# Patient Record
Sex: FEMALE | Race: WHITE | NOT HISPANIC OR LATINO | URBAN - METROPOLITAN AREA
[De-identification: names, ages, dates, MRNs, and addresses within clinical notes are randomized per-mention and may not be internally consistent; named-entity substitution may affect disease eponyms.]

---

## 2023-10-05 ENCOUNTER — HOME VISIT (OUTPATIENT)
Dept: PRIMARY CARE | Facility: CLINIC | Age: 83
End: 2023-10-05

## 2023-10-05 DIAGNOSIS — I48.11 LONGSTANDING PERSISTENT ATRIAL FIBRILLATION (MULTI): ICD-10-CM

## 2023-10-05 DIAGNOSIS — F03.90 SENILE DEMENTIA, UNCOMPLICATED (MULTI): ICD-10-CM

## 2023-10-05 DIAGNOSIS — I10 PRIMARY HYPERTENSION: Primary | ICD-10-CM

## 2023-10-05 DIAGNOSIS — F41.9 ANXIETY: ICD-10-CM

## 2023-10-05 PROCEDURE — 99349 HOME/RES VST EST MOD MDM 40: CPT | Performed by: INTERNAL MEDICINE

## 2023-10-09 PROBLEM — I10 PRIMARY HYPERTENSION: Status: ACTIVE | Noted: 2023-10-09

## 2023-10-09 PROBLEM — F03.90 SENILE DEMENTIA, UNCOMPLICATED (MULTI): Status: ACTIVE | Noted: 2023-10-09

## 2023-10-09 PROBLEM — F41.9 ANXIETY: Status: ACTIVE | Noted: 2023-10-09

## 2023-10-09 PROBLEM — I48.11 LONGSTANDING PERSISTENT ATRIAL FIBRILLATION (MULTI): Status: ACTIVE | Noted: 2023-10-09

## 2023-10-09 NOTE — PROGRESS NOTES
Subjective   Patient ID: Kathleen Willams is a 83 y.o. female who presents for No chief complaint on file..  [facility]: [facility]  [facility]      Kathleen Willams is a 83 y.o. female that is presenting today for No chief complaint on file..  This encounter was created solely for billing purposes. The full encounter details, including the assessment and plan for the patient, can be found at the [facility] facility.    Pepe Terry MD     Review of Systems    Objective   Physical Exam    Assessment/Plan   Diagnoses and all orders for this visit:  Primary hypertension  Longstanding persistent atrial fibrillation (CMS/HCC)  Senile dementia, uncomplicated (CMS/HCC)  Anxiety

## 2023-10-09 NOTE — PROGRESS NOTES
Subjective   Patient ID: Kathleen Willams is a 83 y.o. female who presents for No chief complaint on file..  [facility]: [facility]  [facility]      Kathleen Willams is a 83 y.o. female that is presenting today for follow up care.This encounter was created solely for billing purposes. The full encounter details, including the assessment and plan for the patient, can be found at the [place] facility.    Pepe Terry MD     Review of Systems    Objective   Physical Exam    Assessment/Plan   There are no diagnoses linked to this encounter.

## 2024-01-04 ENCOUNTER — HOME VISIT (OUTPATIENT)
Dept: PRIMARY CARE | Facility: CLINIC | Age: 84
End: 2024-01-04
Payer: MEDICARE

## 2024-01-04 DIAGNOSIS — F41.9 ANXIETY: ICD-10-CM

## 2024-01-04 DIAGNOSIS — F03.90 SENILE DEMENTIA, UNCOMPLICATED (MULTI): ICD-10-CM

## 2024-01-04 DIAGNOSIS — I48.11 LONGSTANDING PERSISTENT ATRIAL FIBRILLATION (MULTI): ICD-10-CM

## 2024-01-04 DIAGNOSIS — I10 PRIMARY HYPERTENSION: Primary | ICD-10-CM

## 2024-01-04 PROCEDURE — 99348 HOME/RES VST EST LOW MDM 30: CPT | Performed by: INTERNAL MEDICINE

## 2024-01-04 NOTE — PROGRESS NOTES
[facility]: [facility]  [facility]      Kathleen Willams is a 83 y.o. female that is presenting today for No chief complaint on file..  This encounter was created solely for billing purposes. The full encounter details, including the assessment and plan for the patient, can be found at the [facility] facility.    Pepe Terry MD

## 2024-04-04 ENCOUNTER — HOME VISIT (OUTPATIENT)
Dept: PRIMARY CARE | Facility: CLINIC | Age: 84
End: 2024-04-04
Payer: MEDICARE

## 2024-04-04 DIAGNOSIS — I10 PRIMARY HYPERTENSION: ICD-10-CM

## 2024-04-04 DIAGNOSIS — F03.90 SENILE DEMENTIA, UNCOMPLICATED (MULTI): ICD-10-CM

## 2024-04-04 DIAGNOSIS — I48.11 LONGSTANDING PERSISTENT ATRIAL FIBRILLATION (MULTI): Primary | ICD-10-CM

## 2024-04-04 PROCEDURE — 99348 HOME/RES VST EST LOW MDM 30: CPT | Performed by: INTERNAL MEDICINE

## 2024-04-05 NOTE — PROGRESS NOTES
[facility]: [facility]  [facility]NCOUNTER      Kathleen Willams is a 83 y.o. female that is presenting today for No chief complaint on file..  This encounter was created solely for billing purposes. The full encounter details, including the assessment and plan for the patient, can be found at the [facility].    Pepe Terry MD

## 2024-07-11 ENCOUNTER — HOME VISIT (OUTPATIENT)
Dept: PRIMARY CARE | Facility: CLINIC | Age: 84
End: 2024-07-11
Payer: MEDICARE

## 2024-07-11 DIAGNOSIS — I48.11 LONGSTANDING PERSISTENT ATRIAL FIBRILLATION (MULTI): Primary | ICD-10-CM

## 2024-07-11 DIAGNOSIS — F03.90 SENILE DEMENTIA, UNCOMPLICATED (MULTI): ICD-10-CM

## 2024-07-11 DIAGNOSIS — I10 PRIMARY HYPERTENSION: ICD-10-CM

## 2024-07-15 NOTE — PROGRESS NOTES
[facility]: [facility]  [facility]NCOUN[facility]      Kathleen Willams is a 83 y.o. female that is presenting today for No chief complaint on file..  This encounter was created solely for billing purposes. The full encounter details, including the assessment and plan for the patient, can be found at the [facility].    Pepe Terry MD

## 2024-10-03 ENCOUNTER — HOME VISIT (OUTPATIENT)
Dept: PRIMARY CARE | Facility: CLINIC | Age: 84
End: 2024-10-03
Payer: MEDICARE

## 2024-10-03 DIAGNOSIS — F03.90 SENILE DEMENTIA, UNCOMPLICATED (MULTI): ICD-10-CM

## 2024-10-03 DIAGNOSIS — I10 PRIMARY HYPERTENSION: Primary | ICD-10-CM

## 2024-10-03 DIAGNOSIS — E16.2 HYPOGLYCEMIA: ICD-10-CM

## 2024-10-03 DIAGNOSIS — I48.11 LONGSTANDING PERSISTENT ATRIAL FIBRILLATION (MULTI): ICD-10-CM

## 2024-10-03 PROCEDURE — 99349 HOME/RES VST EST MOD MDM 40: CPT | Performed by: INTERNAL MEDICINE

## 2024-10-04 NOTE — PROGRESS NOTES
[facility]: [facility]  [facility]NCOUN[facility]      Kathleen Willams is a 84 y.o. female that is presenting today for No chief complaint on file..  This encounter was created solely for billing purposes. The full encounter details, including the assessment and plan for the patient, can be found at the [facility].    Pepe Terry MD

## 2025-01-02 ENCOUNTER — HOME VISIT (OUTPATIENT)
Dept: PRIMARY CARE | Facility: CLINIC | Age: 85
End: 2025-01-02
Payer: MEDICARE

## 2025-01-02 DIAGNOSIS — F03.90 SENILE DEMENTIA, UNCOMPLICATED (MULTI): ICD-10-CM

## 2025-01-02 DIAGNOSIS — I48.11 LONGSTANDING PERSISTENT ATRIAL FIBRILLATION (MULTI): ICD-10-CM

## 2025-01-02 DIAGNOSIS — I10 PRIMARY HYPERTENSION: Primary | ICD-10-CM

## 2025-01-02 PROCEDURE — 99349 HOME/RES VST EST MOD MDM 40: CPT | Performed by: INTERNAL MEDICINE

## 2025-01-08 NOTE — PROGRESS NOTES
[facility]: [facility]  [facility] ENCOUNTER      Kathleen Willams is a 84 y.o. female that is presenting today for No chief complaint on file..  This encounter was created solely for billing purposes. The full encounter details, including the assessment and plan for the patient, can be found at the [facility].  Copy may be found in media section in epic chart.    Pepe Terry MD

## 2025-04-10 ENCOUNTER — HOME VISIT (OUTPATIENT)
Dept: PRIMARY CARE | Facility: CLINIC | Age: 85
End: 2025-04-10
Payer: MEDICARE

## 2025-04-10 DIAGNOSIS — I10 PRIMARY HYPERTENSION: Primary | ICD-10-CM

## 2025-04-10 DIAGNOSIS — F03.90 SENILE DEMENTIA, UNCOMPLICATED (MULTI): ICD-10-CM

## 2025-04-10 DIAGNOSIS — I48.11 LONGSTANDING PERSISTENT ATRIAL FIBRILLATION (MULTI): ICD-10-CM

## 2025-04-18 NOTE — PROGRESS NOTES
[facility]: [facility]  [facility]NCOUNTER      Kathleen Willams is a 84 y.o. female that is presenting today for No chief complaint on file..  This encounter was created solely for billing purposes. The full encounter details, including the assessment and plan for the patient, can be found at the [facility].  Copy may be found in media section in epic chart.    Pepe Terry MD

## 2025-06-04 DIAGNOSIS — G47.00 INSOMNIA, UNSPECIFIED TYPE: ICD-10-CM

## 2025-06-04 RX ORDER — TALC
3 POWDER (GRAM) TOPICAL NIGHTLY
Qty: 90 TABLET | Refills: 0 | Status: SHIPPED | OUTPATIENT
Start: 2025-06-04

## 2025-08-14 ENCOUNTER — HOME VISIT (OUTPATIENT)
Dept: PRIMARY CARE | Facility: CLINIC | Age: 85
End: 2025-08-14
Payer: MEDICARE

## 2025-08-14 DIAGNOSIS — F41.9 ANXIETY: ICD-10-CM

## 2025-08-14 DIAGNOSIS — F03.90 SENILE DEMENTIA, UNCOMPLICATED (MULTI): ICD-10-CM

## 2025-08-14 DIAGNOSIS — I10 PRIMARY HYPERTENSION: Primary | ICD-10-CM

## 2025-08-14 DIAGNOSIS — I48.11 LONGSTANDING PERSISTENT ATRIAL FIBRILLATION (MULTI): ICD-10-CM
